# Patient Record
Sex: FEMALE | Race: WHITE | NOT HISPANIC OR LATINO | Employment: FULL TIME | URBAN - METROPOLITAN AREA
[De-identification: names, ages, dates, MRNs, and addresses within clinical notes are randomized per-mention and may not be internally consistent; named-entity substitution may affect disease eponyms.]

---

## 2021-11-17 ENCOUNTER — HISTORICAL (OUTPATIENT)
Dept: ADMINISTRATIVE | Facility: HOSPITAL | Age: 46
End: 2021-11-17

## 2021-11-22 ENCOUNTER — HISTORICAL (OUTPATIENT)
Dept: ADMINISTRATIVE | Facility: HOSPITAL | Age: 46
End: 2021-11-22

## 2021-11-22 LAB
ALBUMIN SERPL-MCNC: 4.4 GM/DL (ref 3.4–5)
ALBUMIN/GLOB SERPL: 1.63 {RATIO} (ref 1.5–2.5)
ALP SERPL-CCNC: 67 UNIT/L (ref 38–126)
ALT SERPL-CCNC: 14 UNIT/L (ref 7–52)
AST SERPL-CCNC: 15 UNIT/L (ref 15–37)
BILIRUB SERPL-MCNC: 0.3 MG/DL (ref 0.2–1)
BILIRUBIN DIRECT+TOT PNL SERPL-MCNC: 0.1 MG/DL (ref 0–0.5)
BILIRUBIN DIRECT+TOT PNL SERPL-MCNC: 0.2 MG/DL
BUN SERPL-MCNC: 10 MG/DL (ref 7–18)
CALCIUM SERPL-MCNC: 9.3 MG/DL (ref 8.5–10)
CHLORIDE SERPL-SCNC: 105 MMOL/L (ref 98–107)
CHOLEST SERPL-MCNC: 201 MG/DL (ref 0–200)
CHOLEST/HDLC SERPL: 3.9 {RATIO}
CO2 SERPL-SCNC: 27 MMOL/L (ref 21–32)
CREAT SERPL-MCNC: 0.72 MG/DL (ref 0.6–1.3)
GLOBULIN SER-MCNC: 2.8 GM/DL (ref 1.2–3)
GLUCOSE SERPL-MCNC: 87 MG/DL (ref 74–106)
HDLC SERPL-MCNC: 52 MG/DL (ref 35–60)
LDLC SERPL CALC-MCNC: 127 MG/DL (ref 0–129)
POTASSIUM SERPL-SCNC: 4.4 MMOL/L (ref 3.5–5.1)
PROT SERPL-MCNC: 7.1 GM/DL (ref 6.4–8.2)
SODIUM SERPL-SCNC: 140 MMOL/L (ref 136–145)
TRIGL SERPL-MCNC: 107 MG/DL (ref 30–150)
VLDLC SERPL CALC-MCNC: 21.4 MG/DL

## 2022-04-09 ENCOUNTER — HISTORICAL (OUTPATIENT)
Dept: ADMINISTRATIVE | Facility: HOSPITAL | Age: 47
End: 2022-04-09

## 2022-04-26 VITALS
BODY MASS INDEX: 30.94 KG/M2 | HEIGHT: 63 IN | WEIGHT: 174.63 LBS | SYSTOLIC BLOOD PRESSURE: 128 MMHG | DIASTOLIC BLOOD PRESSURE: 88 MMHG

## 2022-05-20 NOTE — HISTORICAL [facility] CERNER
This is a historical note converted from [facility]. Formatting and pictures may have been removed.  Please reference [facility] for original formatting and attached multimedia. Chief Complaint  CPX  History of Present Illness  Here for annual wellness examination and recheck of chronic conditions including?fatigue, anxiety, asthma, insomnia. ?Also complaining of some cervical neck pain.  Up-to-date with health maintenance preventive screenings.? Sees  for GYN examination. ?Last mammogram?2021.? Up-to-date with?vaccinations.  Denies nicotine/tobacco use. ?Denies illicit drug use. ?Denies alcohol use.  Reports 100% compliance with all prescribed medications. ?Denies any side effects as adverse reaction/intolerance. ?Medications are working well/as intended. ?Patient ?is receiving desired effects.  Concerning her cervical neck pain,?no particular aggravating or alleviating factors. ?Has not yet seen chiropractic or physical therapy for these complaints.  Review of Systems  ?14 point Review of Systems performed with no exceptions for new complaints other than as noted in HPI.  Physical Exam  Vitals & Measurements  HR:?88(Peripheral)? BP:?128/88?  HT:?160.02?cm? HT:?160.02?cm? WT:?79.2?kg? WT:?79.200?kg? BMI:?30.93?  Well developed, well nourished, NAD, alert and oriented x4  Vitals reviewed  Head: NCAT  Eyes: EOMI, conjunctiva WNL, pupils symmetric  Ears: TMs and EACs clear  Nose: Mucosa WNL, No discharge, No sinus tenderness  O/P: No erythema or exudate  Neck: supple, FROM, no LA, no thyromegaly, no bruits auscultated  CV: RRR no MRG, peripheral pulses intact  Pulmonary: Effort normal and breath sounds normal, no wheeze  Abdomen: soft, NTND, no HSM; ? NABS; no peritoneal signs ? ??  Musculoskeletal: ?no tenderness, joints wnl for acute changes, FROM, no CCE  Skin: warm, dry, intact  Neuro: no motor/sensory deficits, cranial nerves grossly intact, cerebellar function appears intact  Lymphadenopathy: no abnormalities  noted  Psychiatric: Cooperative, appropriate mood and affect, appears to have normal judgment  ?  Assessment/Plan  1.?Wellness examination?Z00.00  ?Recommend 3-4 days of 30-45 minutes sessions of brisk walking/water aerobics/resistance training as tolerating. ?Recommend limiting excess simple carbohydrates from daily dietary regimen. recommend increase lean protein and vegetable matter.  Wellness labs today.  2.?Fatigue?R53.83  Well-controlled on current medication regimen. ?Continue as prescribed.  3.?Anxiety?F41.9  Well-controlled on current medication regimen. ?Continue as prescribed.  4.?Asthma?J45.909  ?Stable/well-controlled.  5.?Insomnia?G47.00  Insomnia is improved with meds; discussed black box warning and precautions regarding abnormal behavior on sedatives; has had no problems; reviewed recommendation to avoid etoh and any other mind altering substances when taking sleep aids; patient will d/c meds and call if has any trouble;  ?  Also recommend and discussed benefits of potentially weaning off prescription sleep aid.  Recommend practicing sleep hygiene while trying vacation from sleep aid/prescription sleep aid.  ?  ?  Sleep hygiene refers to actions that tend to improve and maintain good sleep:  ?Sleep as long as necessary to feel rested (usually seven to eight hours for adults) and then get out of bed  ?Maintain a regular sleep schedule, particularly a regular wake-up time in the morning  ?Try not to force sleep  ?Avoid caffeinated beverages after lunch  ?Avoid alcohol near bedtime (eg, late afternoon and evening)  ?Avoid smoking or other nicotine intake, particularly during the evening  ?Adjust the bedroom environment as needed to decrease stimuli (eg, reduce ambient light, turn off the television or radio)  ?Avoid prolonged use of light-emitting screens (laptops, tablets, smartphones, ebooks) before bedtime  ?Resolve concerns or worries before bedtime  ?Exercise regularly for at least 20 minutes,  preferably more than four to five hours prior to bedtime  ?Avoid daytime naps, especially if they are longer than 20 to 30 minutes or occur late in the day  ?  Designate an 8 hour slot specifically for sleeping  ?Allow yourself an hour to decompress prior to bed; journal, read, prayer  ?The bedroom should be utilized for sleep and intimacy only  ?If unable to sleep, get out of bed, read, sit quietly, or prayer  ?  6.?Cervical pain (neck)?M54.2  ?Recommend chiropractic/physical therapy. ?She will notify us?should she desire/need referral.  Return to clinic in 4 to 6 months for recheck.  Referrals  Clinic Follow up, 03/17/22 14:52:19 CDT, Cervical pain (neck), [facility]  Clinic Follow up, *Est. 06/17/22 8:45:00 CDT, Order for future visit, Anxiety, [facility]   Problem List/Past Medical History  Ongoing  Anxiety  Asthma  Fatigue  H/O cold sores  Insomnia  Medication management  Obesity  Historical  No qualifying data  Medications  gabapentin 100 mg oral capsule, See Instructions  sertraline 50 mg oral tablet, 50 mg= 1 tab(s), Oral, Daily, 1 refills  zolpidem 10 mg oral tablet, 10 mg= 1 tab(s), Oral, Once a day (at bedtime), 1 refills  Allergies  No Known Medication Allergies  Social History  Abuse/Neglect  No, 03/17/2022  Alcohol  Current, 05/21/2018  Employment/School  Employed, Work/School description: lpn., 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 Living situation: Home/Independent., 05/21/2018  Substance Use  Never, 05/21/2018  Tobacco  Never (less than 100 in lifetime), N/A, 03/17/2022  Family History  Alcoholism.: Father and Brother.  Asthma.: Sister.  Bipolar: Sister.  Depression.: Mother.  Rheumatoid arthritis: Mother.  Immunizations  Vaccine Date Status   influenza virus vaccine, inactivated 09/20/2021 Recorded   COVID-19 mRNA, LNP-S, PF - Moderna 02/11/2021 Recorded   COVID-19 mRNA, LNP-S, PF - Moderna 01/14/2021 Recorded   influenza virus vaccine, inactivated 10/31/2020 Recorded    tetanus/diphtheria/pertussis, acel(Tdap) 08/20/2020 Given   pneumococcal 13-valent conjugate vaccine 08/20/2020 Given   influenza virus vaccine, inactivated 11/06/2018 Given   pneumococcal 23-polyvalent vaccine 12/06/2017 Recorded   influenza virus vaccine, inactivated 10/27/2015 Recorded   influenza virus vaccine, inactivated 11/03/2014 Recorded   influenza virus vaccine, inactivated 11/01/2013 Recorded   tetanus/diphth/pertuss (Tdap) adult/adol 05/26/2009 Recorded   Health Maintenance  Health Maintenance  ???Pending?(in the next year)  ??? ??OverDue  ??? ? ? ?Alcohol Misuse Screening due??01/02/22??and every 1??year(s)  ??? ??Due?  ??? ? ? ?ADL Screening due??04/25/22??and every 1??year(s)  ??? ? ? ?Asthma Management-Asthma Education due??04/25/22??and every 6??month(s)  ??? ? ? ?Asthma Management-Spirometry due??04/25/22??Variable frequency  ??? ? ? ?Asthma Management-Sims [place] Flow due??04/25/22??Variable frequency  ??? ? ? ?Asthma Management-Written Action Plan due??04/25/22??and every 6??month(s)  ??? ??Due In Future?  ??? ? ? ?Obesity Screening not due until??01/01/23??and every 1??year(s)  ??? ? ? ?Blood Pressure Screening not due until??03/17/23??and every 1??year(s)  ??? ? ? ?Body Mass Index Check not due until??03/17/23??and every 1??year(s)  ??? ? ? ?Depression Screening not due until??03/17/23??and every 1??year(s)  ???Satisfied?(in the past 1 year)  ??? ??Satisfied?  ??? ? ? ?Blood Pressure Screening on??03/17/22.??Satisfied by Donna Saunders LPN  ??? ? ? ?Body Mass Index Check on??03/17/22.??Satisfied by Donna Saunders LPN  ??? ? ? ?Breast Cancer Screening on??11/04/21.??Satisfied by Natasha Ayon  ??? ? ? ?Depression Screening on??03/17/22.??Satisfied by Donna Saunders LPN  ??? ? ? ?Diabetes Screening on??11/22/21.??Satisfied by Ty Johansen  ??? ? ? ?Influenza Vaccine on??09/20/21.??Satisfied by Donna Saunders LPN  ??? ? ? ?Lipid Screening on??11/22/21.??Satisfied by Ty Johansen  ??? ? ? ?Obesity  Screening on??03/17/22.??Satisfied by Donna Saunders LPN  ?      Physician?was in the building when patient was?seen and examined by the nurse practitioner.? I concur with the?assessment/plan/management?of the patient.

## 2022-06-30 PROBLEM — R53.83 FATIGUE: Status: ACTIVE | Noted: 2022-06-30

## 2022-06-30 PROBLEM — G47.00 INSOMNIA: Status: ACTIVE | Noted: 2022-06-30

## 2022-06-30 PROBLEM — F41.1 ANXIETY STATE: Status: ACTIVE | Noted: 2022-06-30

## 2022-06-30 PROBLEM — M76.32 ILIOTIBIAL BAND SYNDROME, LEFT LEG: Status: ACTIVE | Noted: 2021-03-01

## 2022-06-30 PROBLEM — J45.909 ASTHMA: Status: ACTIVE | Noted: 2022-06-30

## 2022-06-30 PROBLEM — F32.9 MAJOR DEPRESSIVE DISORDER WITH SINGLE EPISODE: Status: ACTIVE | Noted: 2022-06-30

## 2023-02-01 ENCOUNTER — LAB VISIT (OUTPATIENT)
Dept: LAB | Facility: HOSPITAL | Age: 48
End: 2023-02-01
Attending: OBSTETRICS & GYNECOLOGY
Payer: COMMERCIAL

## 2023-02-01 DIAGNOSIS — N95.1 SYMPTOMATIC MENOPAUSAL OR FEMALE CLIMACTERIC STATES: Primary | ICD-10-CM

## 2023-02-01 LAB
ESTRADIOL SERPL HS-MCNC: 51 PG/ML
FSH SERPL-ACNC: 3.76 MIU/ML
LH SERPL-ACNC: 2.43 MIU/ML
PROGEST SERPL-MCNC: 4 NG/ML
TSH SERPL-ACNC: 1.06 UIU/ML (ref 0.35–4.94)

## 2023-02-01 PROCEDURE — 84144 ASSAY OF PROGESTERONE: CPT

## 2023-02-01 PROCEDURE — 84443 ASSAY THYROID STIM HORMONE: CPT

## 2023-02-01 PROCEDURE — 82670 ASSAY OF TOTAL ESTRADIOL: CPT

## 2023-02-01 PROCEDURE — 36415 COLL VENOUS BLD VENIPUNCTURE: CPT

## 2023-02-01 PROCEDURE — 83002 ASSAY OF GONADOTROPIN (LH): CPT

## 2023-02-01 PROCEDURE — 83001 ASSAY OF GONADOTROPIN (FSH): CPT
